# Patient Record
(demographics unavailable — no encounter records)

---

## 2025-02-28 NOTE — PHYSICAL EXAM
[Chaperone Declined] : Patient declined chaperone [Appropriately responsive] : appropriately responsive [Alert] : alert [No Acute Distress] : no acute distress [No Lymphadenopathy] : no lymphadenopathy [Soft] : soft [Non-tender] : non-tender [Non-distended] : non-distended [No HSM] : No HSM [No Lesions] : no lesions [No Mass] : no mass [Oriented x3] : oriented x3 [Examination Of The Breasts] : a normal appearance [No Masses] : no breast masses were palpable [Labia Majora] : normal [Labia Minora] : normal [Uterine Adnexae] : normal [Normal rectal exam] : was normal [Normal Brown Stool] : was normal and brown [Normal] : was normal [None] : there was no rectal mass  [Occult Blood Positive] : was negative for occult blood analysis [Internal Hemorrhoid] : no internal hemorrhoids were present [External Hemorrhoid] : no external hemorrhoids were present [Skin Tags] : no residual hemorrhoidal skin tags

## 2025-02-28 NOTE — HISTORY OF PRESENT ILLNESS
[FreeTextEntry1] : Patient is a 50 yo female here today for annual visit. Pt on OCP tolerating well, amenorrheic. Pt reports mild VMS   hx skin lesion removed from vulva, genetics negative 2020 family hx- aunt breast cancer

## 2025-02-28 NOTE — PLAN
[FreeTextEntry1] : Patient to follow up in 1 year for annual GYN exam reviewed VMS treatment, at this time advised to cont OCP. Pt with no recent change in lexapro dose, advised to f/u with PCP re: dosing. Mammogram due: 12/25 Colonoscopy due: 2028 Bone density due: pm Pap ordered Hemoccult ordered All questions answered, patient agreeable with plan